# Patient Record
Sex: FEMALE
[De-identification: names, ages, dates, MRNs, and addresses within clinical notes are randomized per-mention and may not be internally consistent; named-entity substitution may affect disease eponyms.]

---

## 2020-03-03 ENCOUNTER — HOSPITAL ENCOUNTER (EMERGENCY)
Dept: HOSPITAL 56 - MW.ED | Age: 20
Discharge: HOME | End: 2020-03-03
Payer: MEDICAID

## 2020-03-03 DIAGNOSIS — Z88.0: ICD-10-CM

## 2020-03-03 DIAGNOSIS — J02.9: Primary | ICD-10-CM

## 2020-03-03 PROCEDURE — 87804 INFLUENZA ASSAY W/OPTIC: CPT

## 2020-03-03 PROCEDURE — 87880 STREP A ASSAY W/OPTIC: CPT

## 2020-03-03 PROCEDURE — 96372 THER/PROPH/DIAG INJ SC/IM: CPT

## 2020-03-03 PROCEDURE — 99283 EMERGENCY DEPT VISIT LOW MDM: CPT

## 2020-03-03 PROCEDURE — 87081 CULTURE SCREEN ONLY: CPT

## 2020-03-03 NOTE — EDM.PDOC
ED HPI GENERAL MEDICAL PROBLEM





- General


Chief Complaint: ENT Problem


Stated Complaint: SORE THROAT


Time Seen by Provider: 03/03/20 10:16


Source of Information: Reports: Patient


History Limitations: Reports: No Limitations





- History of Present Illness


INITIAL COMMENTS - FREE TEXT/NARRATIVE: 


HISTORY AND PHYSICAL:





History of present illness:


Patient is a 19-year-old female who presents to the emergency room with 

complaints of sore throat, ear pain and sinus pressure x1 month.  She states 

she did see her primary care provider a week and a half ago who did a sinus x-

ray and prescribed her a Z-Dave for her symptoms.  She states her ear pain 

improved but she continues to have throat discomfort.  Patient denies any fever

, chills, headache, change in vision, syncope or near syncope. Denies any chest 

pain, back pain, shortness of breath or cough. Denies any GI or  symptoms.  

Patient has been eating and drinking appropriately.





Review of systems: 


As per history of present illness and below otherwise all systems reviewed and 

negative.





Past medical history: 


As per history of present illness and as reviewed below otherwise 

noncontributory.





Surgical history: 


As per history of present illness and as reviewed below otherwise 

noncontributory.





Social history: 


See social history for further information





Family history: 


As per history of present illness and as reviewed below otherwise 

noncontributory.





Physical exam:


General: Well-developed and well-nourished 19-year-old female.  Alert and 

oriented.  Nontoxic-appearing and in no acute distress.


HEENT: Atraumatic, normocephalic, pupils equal and reactive bilaterally, 

negative for conjunctival pallor or scleral icterus, mucous membranes moist, 

TMs normal bilaterally, throat mild erythema without exudate or soft tissue 

swelling, neck supple, nontender, trachea midline. No drooling or trismus 

noted. No meningeal signs. No hot potato voice noted. 


Lungs: Clear to auscultation, breath sounds equal bilaterally, chest nontender.


Heart: S1S2, regular rate and rhythm without overt murmur


Abdomen: Soft, nondistended, nontender. 


Skin: Intact, warm, dry. No lesions or rashes noted.


Extremities: Atraumatic, moves all extremities per self without difficulty or 

deficits, negative for cords or calf pain. Neurovascular unremarkable.


Neuro: Awake, alert, oriented. Cranial nerves II through XII unremarkable. 

Cerebellum unremarkable. Motor and sensory unremarkable throughout. Exam 

nonfocal.





Notes:


We discussed concerns of STD's/exposure; she declines any concern at this time. 





Patient's influenza and strep are negative.  She states that she would like to 

be covered prophylactically for possible STD exposure orally.  We discussed 

following up with an ear nose and throat specialist.  Supportive care measures 

were reviewed and discussed. Voices understanding and is agreeable to plan of 

care. Denies any further questions or concerns at this time.





Diagnostics:


Strep, influenza





Therapeutics:


Rocephin





Prescription:


Doxycylcine





Impression: 


Pharyngitis





Plan:


1. Take your medication as directed. Good handwashing and contact precautions 

as we discussed.


2. Warm Salt water gargles (rinse and spit) 3-4 x daily. Please get a new tooth 

brush after completion of your medication


3. Tylenol and or ibuprofen as needed for pain management.


4. Follow-up with your primary care provider or ENT as we discussed. Return to 

the ED as needed and as discussed.


Definitive disposition and diagnosis as appropriate pending reevaluation and 

review of above.





  ** Throat


Pain Score (Numeric/FACES): 8





- Related Data


 Allergies











Allergy/AdvReac Type Severity Reaction Status Date / Time


 


Penicillins Allergy  Hives Verified 03/03/20 10:31











Home Meds: 


 Home Meds





Albuterol [Ventolin HFA] 1 puff INH Q4H #1 inhaler 11/21/18 [Rx]


Doxycycline [Vibramycin] 100 mg PO BID 14 Days #28 cap 03/03/20 [Rx]


Doxycycline [Vibramycin] 100 mg PO BID 7 Days #14 tab 03/03/20 [Rx]











Past Medical History





- Past Health History


Medical/Surgical History: Denies Medical/Surgical History





- Infectious Disease History


Infectious Disease History: Reports: None





Social & Family History





- Family History


Cardiac: Reports: CAD


Endocrine/Metabolic: Reports: Diabetes, Type I





- Tobacco Use


Smoking Status *Q: Former Smoker


Used Tobacco, but Quit: Yes


Month/Year Tobacco Last Used: 2020





- Caffeine Use


Caffeine Use: Reports: Coffee, Energy Drinks, Soda, Tea





- Recreational Drug Use


Recreational Drug Use: No





ED ROS ENT





- Review of Systems


Review Of Systems: Comprehensive ROS is negative, except as noted in HPI.





ED EXAM, ENT





- Physical Exam


Exam: See Below (See dictation)





Course





- Vital Signs


Last Recorded V/S: 


 Last Vital Signs











Temp  98.3 F   03/03/20 10:31


 


Pulse  78   03/03/20 10:31


 


Resp  18   03/03/20 10:31


 


BP  90/47 L  03/03/20 10:31


 


Pulse Ox  99   03/03/20 10:31














- Orders/Labs/Meds


Orders: 


 Active Orders 24 hr











 Category Date Time Status


 


 CULTURE STREP A CONFIRMATION [] Stat Lab  03/03/20 10:35 Results


 


 STREP SCRN A RAPID W CULT CONF [RM] Stat Lab  03/03/20 10:35 Results














Departure





- Departure


Time of Disposition: 11:17


Disposition: Home, Self-Care 01


Clinical Impression: 


 Pharyngitis








- Discharge Information


Instructions:  Pharyngitis, Easy-to-Read


Referrals: 


PCP,Not In Area [Primary Care Provider] - 


Forms:  ED Department Discharge


Additional Instructions: 


The following information is given to patients seen in the emergency department 

who are being discharged to home. This information is to outline your options 

for follow-up care. We provide all patients seen in our emergency department 

with a follow-up referral.





The need for follow-up, as well as the timing and circumstances, are variable 

depending upon the specifics of your emergency department visit.





If you don't have a primary care physician on staff, we will provide you with a 

referral. We always advise you to contact your personal physician following an 

emergency department visit to inform them of the circumstance of the visit and 

for follow-up with them and/or the need for any referrals to a consulting 

specialist.





The emergency department will also refer you to a specialist when appropriate. 

This referral assures that you have the opportunity for follow-up care with a 

specialist. All of these measure are taken in an effort to provide you with 

optimal care, which includes your follow-up.





Under all circumstances we always encourage you to contact your private 

physician who remains a resource for coordinating your care. When calling for 

follow-up care, please make the office aware that this follow-up is from your 

recent emergency room visit. If for any reason you are refused follow-up, 

please contact the [facility] Emergency 

Department at [phone] and asked to speak to the emergency department 

charge nurse.





[facility]


Primary Care


[address] [place] [address]


Phone: [phone]


Fax: [phone]





[facility]


[address] [place] [address]


Phone: [phone]


Fax: [phone]





1. Take your medication as directed. Good handwashing and contact precautions 

as we discussed.


2. Warm Salt water gargles (rinse and spit) 3-4 x daily. Please get a new tooth 

brush after completion of your medication


3. Tylenol and or ibuprofen as needed for pain management.


4. Follow-up with your primary care provider or ENT as we discussed. Return to 

the ED as needed and as discussed.





Sepsis Event Note





- Evaluation


Sepsis Screening Result: No Definite Risk





- Focused Exam


Vital Signs: 


 Vital Signs











  Temp Pulse Resp BP Pulse Ox


 


 03/03/20 10:31  98.3 F  78  18  90/47 L  99











Date Exam was Performed: 03/03/20


Time Exam was Performed: 11:05





- My Orders


Last 24 Hours: 


My Active Orders





03/03/20 10:35


CULTURE STREP A CONFIRMATION [RM] Stat 


STREP SCRN A RAPID W CULT CONF [RM] Stat 














- Assessment/Plan


Last 24 Hours: 


My Active Orders





03/03/20 10:35


CULTURE STREP A CONFIRMATION [RM] Stat 


STREP SCRN A RAPID W CULT CONF [] Stat

## 2020-03-16 ENCOUNTER — HOSPITAL ENCOUNTER (EMERGENCY)
Dept: HOSPITAL 56 - MW.ED | Age: 20
LOS: 1 days | Discharge: HOME | End: 2020-03-17
Payer: COMMERCIAL

## 2020-03-16 DIAGNOSIS — S50.02XA: Primary | ICD-10-CM

## 2020-03-16 DIAGNOSIS — Z88.0: ICD-10-CM
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
## 2020-03-16 PROCEDURE — 73590 X-RAY EXAM OF LOWER LEG: CPT

## 2020-03-16 PROCEDURE — 73560 X-RAY EXAM OF KNEE 1 OR 2: CPT

## 2020-03-16 PROCEDURE — 73070 X-RAY EXAM OF ELBOW: CPT

## 2020-03-16 PROCEDURE — 73552 X-RAY EXAM OF FEMUR 2/>: CPT

## 2020-03-16 PROCEDURE — 73090 X-RAY EXAM OF FOREARM: CPT

## 2020-03-16 PROCEDURE — 99283 EMERGENCY DEPT VISIT LOW MDM: CPT

## 2020-03-16 PROCEDURE — 72170 X-RAY EXAM OF PELVIS: CPT
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
## 2020-10-06 NOTE — EDM.PDOC
ED HPI GENERAL MEDICAL PROBLEM





- General


Chief Complaint: Laceration


Stated Complaint: CUT ON TOP OF HEAD


Time Seen by Provider: 10/06/20 08:00





- History of Present Illness


INITIAL COMMENTS - FREE TEXT/NARRATIVE: 





History of present illness:


Patient is here for a laceration to the scalp she says this occurred on Saturday

she relates that she was drinking and fell and struck her head on the corner of 

a TV stand.  No loss of consciousness no medications she is not on blood 

thinners she is complaining of some mild headache and there is a laceration to 

the scalp that she is concerned about at this time.  Immunizations are up-to-

date.  





Review of systems: 


As per history of present illness and below otherwise all systems reviewed and 

negative.





Past medical history: 


As per history of present illness and as reviewed below otherwise 

noncontributory.





Surgical history: 


As per history of present illness and as reviewed below otherwise 

noncontributory.





Social history: 


No reported history of drug or alcohol abuse.





Family history: 


As per history of present illness and as reviewed below otherwise 

noncontributory.





Physical exam:


HEENT: There is a small well approximated laceration no bleeding no signs of 

infection this is on the apex of the right temporal scalp, normocephalic, pupils

reactive, negative for conjunctival pallor or scleral icterus, mucous membranes 

moist, throat clear, neck supple, nontender, trachea midline.


Lungs: Clear to auscultation, breath sounds equal bilaterally, chest nontender.


Heart: S1S2, regular, negative for clicks, rubs, or JVD.


Abdomen: Soft, nondistended, nontender. Negative for masses or 

hepatosplenomegaly. Negative for costovertebral tenderness.


Pelvis: Stable nontender.


Genitourinary: Deferred.


Rectal: Deferred.


Extremities: Atraumatic, negative for cords or calf pain. Neurovascular 

unremarkable.


Neuro: Awake, alert, oriented. Cranial nerves II through XII unremarkable. 

Cerebellum unremarkable. Motor and sensory unremarkable throughout. Exam 

nonfocal.  No lateralizing weakness no pronator drift





Diagnostics:


[]





Therapeutics:


[]





Impression: Closed head injury small scalp laceration


[]





Plan: Patient is reassured she will not need any primary wound closure as it is 

been 3 days the wound will be left to heal by secondary intention no signs of 

infection at this time concussion instructions are given to the patient


[]





Definitive disposition and diagnosis as appropriate pending reevaluation and 

review of above.





  ** head


Pain Score (Numeric/FACES): 7





- Related Data


                                    Allergies











Allergy/AdvReac Type Severity Reaction Status Date / Time


 


Penicillins Allergy  Hives Verified 10/06/20 08:03











Home Meds: 


                                    Home Meds





Naproxen [Naprosyn] 500 mg PO Q12HR #20 tab 10/06/20 [Rx]


Non-Formulary Medication [NF Drug] 1 each PO DAILY 10/06/20 [History]











Past Medical History





- Past Health History


Medical/Surgical History: Denies Medical/Surgical History





- Infectious Disease History


Infectious Disease History: Reports: None





Social & Family History





- Family History


Family Medical History: Noncontributory


Cardiac: Reports: CAD


Endocrine/Metabolic: Reports: Diabetes, Type I





- Caffeine Use


Caffeine Use: Reports: Coffee, Energy Drinks, Soda, Tea





ED ROS GENERAL





- Review of Systems


Review Of Systems: See Below





ED EXAM, SKIN/RASH


Exam: See Below





Course





- Vital Signs


Last Recorded V/S: 


                                Last Vital Signs











Temp      


 


Pulse  94   10/06/20 08:04


 


Resp  16   10/06/20 08:04


 


BP  126/84   10/06/20 08:04


 


Pulse Ox  100   10/06/20 08:04














Departure





- Departure


Time of Disposition: 08:09


Disposition: Home, Self-Care 01


Condition: Good


Clinical Impression: 


 Head injury, Scalp laceration








- Discharge Information


*PRESCRIPTION DRUG MONITORING PROGRAM REVIEWED*: Not Applicable


*COPY OF PRESCRIPTION DRUG MONITORING REPORT IN PATIENT TERRI: Not Applicable


Instructions:  Laceration Care, Adult, Head Injury, Adult


Referrals: 


Chrissy Kenny NP [Primary Care Provider] - 


Forms:  ED Department Discharge


Additional Instructions: 


The following information is given to patients seen in the emergency department 

who are being discharged to home. This information is to outline your options 

for follow-up care. We provide all patients seen in our emergency department 

with a follow-up referral.





The need for follow-up, as well as the timing and circumstances, are variable 

depending upon the specifics of your emergency department visit.





If you don't have a primary care physician on staff, we will provide you with a 

referral. We always advise you to contact your personal physician following an 

emergency department visit to inform them of the circumstance of the visit and 

for follow-up with them and/or the need for any referrals to a consulting 

specialist.





The emergency department will also refer you to a specialist when appropriate. 

This referral assures that you have the opportunity for follow-up care with a 

specialist. All of these measure are taken in an effort to provide you with 

optimal care, which includes your follow-up.





Under all circumstances we always encourage you to contact your private 

physician who remains a resource for coordinating your care. When calling for 

follow-up care, please make the office aware that this follow-up is from your 

recent emergency room visit. If for any reason you are refused follow-up, please

contact the [facility] Emergency Department

at [phone] and asked to speak to the emergency department charge nurse.











Sepsis Event Note (ED)





- Focused Exam


Vital Signs: 


                                   Vital Signs











  Pulse Resp BP Pulse Ox


 


 10/06/20 08:04  94  16  126/84  100

## 2021-12-14 NOTE — CR
Indication:



Injury



Comparison:



None available.



Technique:



AP, Lateral, and Oblique views right 5th digit were obtained



Findings:



There is no displaced fracture or dislocation.



The joint spaces are grossly preserved.



There is mild fusiform soft tissue swelling.



Impression:



Mild fusiform soft tissue swelling of the 5th digit without evidence of 

definite displaced fracture.



Dictated by Carter Bazan MD @ 12/14/2021 11:49:37 AM



(Electronically Signed)

## 2021-12-14 NOTE — EDM.PDOC
ED HPI GENERAL MEDICAL PROBLEM





- General


Chief Complaint: Upper Extremity Injury/Pain


Stated Complaint: RT PINKY FINGER PAIN


Time Seen by Provider: 12/14/21 10:42


Source of Information: Reports: Patient


History Limitations: Reports: No Limitations





- History of Present Illness


INITIAL COMMENTS - FREE TEXT/NARRATIVE: 


HISTORY AND PHYSICAL:





History of present illness:


Patient is a 21-year-old female who presents emergency room today with concern 

of right hand pinky finger injury that occurred this morning.  Patient states 

that she was putting together an artificial [place] tree and states that she 

was inserting the top portion of the tree onto the bottom portion when her 

finger got stuck in the pole.  Patient states that it took her a minute to get 

the finger unstuck and since then she does have pain with bending the pinky 

finger.  Patient states that she is fully able to bend and extend it but does 

have discomfort at the joint with doing so.  Patient denies any other 

associative symptoms.





Patient denies fever, chills, chest pain, shortness of breath, or cough. Denies 

headache, neck stiff ness, change in vision, syncope, or near syncope. Denies 

nausea, vomiting, abdominal pain, diarrhea, constipation, or dysuria. Has not 

noted any blood in urine or stool. Patient has been eating and drinking 

appropriately.





Review of systems: 


As per history of present illness and below otherwise all systems reviewed and 

negative.





Past medical history: 


As per history of present illness and as reviewed below otherwise 

noncontributory.





Surgical history: 


As per history of present illness and as reviewed below otherwise 

noncontributory.





Social history: 


See social history for further information





Family history: 


As per history of present illness and as reviewed below otherwise 

noncontributory.





Physical exam:


General: Patient is alert, oriented, and in no acute distress. Patient sitting 

comfortably on exam table.  Vitals stable and reviewed by me


HEENT: Atraumatic, normocephalic, pupils equal and reactive bilaterally, 

negative for conjunctival pallor or scleral icterus, mucous membranes moist, 

throat clear, neck supple, nontender, trachea midline. No drooling or trismus 

noted. No meningeal signs. No hot potato voice noted. 


Lungs: Clear to auscultation, breath sounds equal bilaterally, chest nontender.


Heart: S1S2, regular rate and rhythm without overt murmur


Abdomen: Soft, nondistended, nontender. Negative for masses or 

hepatosplenomegaly. Negative for costovertebral tenderness.


Pelvis: Stable nontender.


Genitourinary: Deferred.


Rectal: Deferred.


Skin: Intact, warm, dry. No lesions or rashes noted.


Extremities: Patient does have some tenderness to palpation of the middle 

phalanx of the right hand fifth digit without obvious deformity.  Patient does 

have full range of motion of the fifth digit of the right upper extremity 

without deficit but does have pain with doing so.  Intact sensation to light and

deep touch of the complete right upper extremity.  Radial pulses grossly intact 

of right upper extremity with capillary refill less than 2 seconds.  Otherwise, 

atraumatic, negative for cords or calf pain. Neurovascular unremarkable.


Neuro: Awake, alert, oriented. Cranial nerves II through XII unremarkable. 

Cerebellum unremarkable. Motor and sensory unremarkable throughout. Exam 

nonfocal.





Medical Decision Making:


Signs and symptoms that were prompt return to the ED thoroughly discussed with 

patient.  Discussed importance for follow-up with a primary care provider.


Voices understanding and is agreeable to plan of care. Denies any further 

questions or concerns at this time.





Diagnostics:


Fifth digit x-ray, right





Therapeutics:


Buddy tape





Prescription:


None





Impression: 


Right finger injury, fifth digit





Plan:


1. Rest, ice, elevate the affected extremity. You can apply ice 15 minutes on, 

15 minutes off. 


2. Tylenol and/or Ibuprofen as directed for pain management or discomfort. 


3. Follow up with the primary care provider as discussed. Return to the ED as 

needed and as discussed.








Definitive disposition and diagnosis as appropriate pending reevaluation and 

review of above.





  ** Right pinky


Pain Score (Numeric/FACES): 4





- Related Data


                                    Allergies











Allergy/AdvReac Type Severity Reaction Status Date / Time


 


Penicillins Allergy  Hives Verified 12/14/21 10:40











Home Meds: 


                                    Home Meds





. [No Known Home Meds]  12/14/21 [History]











Past Medical History





- Past Health History


Medical/Surgical History: Denies Medical/Surgical History





- Infectious Disease History


Infectious Disease History: Reports: None





Social & Family History





- Family History


Family Medical History: No Pertinent Family History


Cardiac: Reports: CAD


Endocrine/Metabolic: Reports: Diabetes, Type I





- Caffeine Use


Caffeine Use: Reports: Energy Drinks





- Recreational Drug Use


Recreational Drug Use: No





Review of Systems





- Review of Systems


Review Of Systems: Comprehensive ROS is negative, except as noted in HPI.





ED EXAM, GENERAL





- Physical Exam


Exam: See Below (see dictation)





Course





- Vital Signs


Last Recorded V/S: 


                                Last Vital Signs











Temp  98 F   12/14/21 10:41


 


Pulse  77   12/14/21 11:47


 


Resp  18   12/14/21 11:47


 


BP  120/71   12/14/21 11:47


 


Pulse Ox  100   12/14/21 11:47














- Orders/Labs/Meds


Orders: 


                               Active Orders 24 hr











 Category Date Time Status


 


 Communication Order [RC] STAT Care  12/14/21 11:53 Active














Departure





- Departure


Time of Disposition: 12:22


Disposition: Home, Self-Care 01


Clinical Impression: 


 Finger injury








- Discharge Information


Forms:  ED Department Discharge


Additional Instructions: 


The following information is given to patients seen in the emergency department 

who are being discharged to home. This information is to outline your options 

for follow-up care. We provide all patients seen in our emergency department 

with a follow-up referral.





The need for follow-up, as well as the timing and circumstances, are variable 

depending upon the specifics of your emergency department visit.





If you don't have a primary care physician on staff, we will provide you with a 

referral. We always advise you to contact your personal physician following an 

emergency department visit to inform them of the circumstance of the visit and 

for follow-up with them and/or the need for any referrals to a consulting 

specialist.





The emergency department will also refer you to a specialist when appropriate. 

This referral assures that you have the opportunity for follow-up care with a 

specialist. All of these measure are taken in an effort to provide you with 

optimal care, which includes your follow-up.





Under all circumstances we always encourage you to contact your private 

physician who remains a resource for coordinating your care. When calling for 

follow-up care, please make the office aware that this follow-up is from your 

recent emergency room visit. If for any reason you are refused follow-up, please

 contact the [facility] Emergency 

Department at [phone] and asked to speak to the emergency department 

charge nurse.





[facility]


Primary Care


[address] [place] [id number]


Phone: [phone]


Fax: [phone]





[address] [place] [address]


Phone: [phone]


Fax: [phone]





1. Rest, ice, elevate the affected extremity. You can apply ice 15 minutes on, 

15 minutes off. 


2. Tylenol and/or Ibuprofen as directed for pain management or discomfort. 


3. Follow up with the primary care provider as discussed. Return to the ED as 

needed and as discussed.








 











Sepsis Event Note (ED)





- Evaluation


Sepsis Screening Result: No Definite Risk





- Focused Exam


Vital Signs: 


                                   Vital Signs











  Temp Pulse Resp BP Pulse Ox


 


 12/14/21 11:47   77  18  120/71  100


 


 12/14/21 10:41  98 F  89  18  123/54 L  100














- My Orders


Last 24 Hours: 


My Active Orders





12/14/21 11:53


Communication Order [RC] STAT 














- Assessment/Plan


Last 24 Hours: 


My Active Orders





12/14/21 11:53


Communication Order [RC] STAT